# Patient Record
Sex: FEMALE | Race: WHITE
[De-identification: names, ages, dates, MRNs, and addresses within clinical notes are randomized per-mention and may not be internally consistent; named-entity substitution may affect disease eponyms.]

---

## 2017-01-20 ENCOUNTER — HOSPITAL ENCOUNTER (INPATIENT)
Dept: HOSPITAL 10 - REC | Age: 67
LOS: 3 days | Discharge: HOME HEALTH | DRG: 470 | End: 2017-01-23
Attending: ORTHOPAEDIC SURGERY | Admitting: ORTHOPAEDIC SURGERY
Payer: COMMERCIAL

## 2017-01-20 VITALS — SYSTOLIC BLOOD PRESSURE: 161 MMHG | DIASTOLIC BLOOD PRESSURE: 72 MMHG | RESPIRATION RATE: 18 BRPM | HEART RATE: 84 BPM

## 2017-01-20 VITALS — DIASTOLIC BLOOD PRESSURE: 65 MMHG | RESPIRATION RATE: 18 BRPM | HEART RATE: 85 BPM | SYSTOLIC BLOOD PRESSURE: 146 MMHG

## 2017-01-20 VITALS — DIASTOLIC BLOOD PRESSURE: 60 MMHG | RESPIRATION RATE: 16 BRPM | HEART RATE: 56 BPM | SYSTOLIC BLOOD PRESSURE: 166 MMHG

## 2017-01-20 VITALS — DIASTOLIC BLOOD PRESSURE: 85 MMHG | SYSTOLIC BLOOD PRESSURE: 163 MMHG | RESPIRATION RATE: 18 BRPM | HEART RATE: 62 BPM

## 2017-01-20 VITALS — HEART RATE: 90 BPM | DIASTOLIC BLOOD PRESSURE: 67 MMHG | RESPIRATION RATE: 18 BRPM | SYSTOLIC BLOOD PRESSURE: 154 MMHG

## 2017-01-20 VITALS — RESPIRATION RATE: 16 BRPM | HEART RATE: 68 BPM | DIASTOLIC BLOOD PRESSURE: 81 MMHG | SYSTOLIC BLOOD PRESSURE: 179 MMHG

## 2017-01-20 VITALS — RESPIRATION RATE: 16 BRPM | DIASTOLIC BLOOD PRESSURE: 95 MMHG | SYSTOLIC BLOOD PRESSURE: 175 MMHG | HEART RATE: 56 BPM

## 2017-01-20 VITALS — RESPIRATION RATE: 16 BRPM | HEART RATE: 60 BPM | SYSTOLIC BLOOD PRESSURE: 175 MMHG | DIASTOLIC BLOOD PRESSURE: 68 MMHG

## 2017-01-20 VITALS — HEART RATE: 87 BPM | SYSTOLIC BLOOD PRESSURE: 156 MMHG | DIASTOLIC BLOOD PRESSURE: 72 MMHG | RESPIRATION RATE: 18 BRPM

## 2017-01-20 VITALS — DIASTOLIC BLOOD PRESSURE: 89 MMHG | HEART RATE: 68 BPM | SYSTOLIC BLOOD PRESSURE: 175 MMHG | RESPIRATION RATE: 16 BRPM

## 2017-01-20 VITALS — RESPIRATION RATE: 20 BRPM | SYSTOLIC BLOOD PRESSURE: 106 MMHG | DIASTOLIC BLOOD PRESSURE: 70 MMHG

## 2017-01-20 VITALS — RESPIRATION RATE: 18 BRPM | HEART RATE: 84 BPM | SYSTOLIC BLOOD PRESSURE: 159 MMHG | DIASTOLIC BLOOD PRESSURE: 68 MMHG

## 2017-01-20 VITALS — RESPIRATION RATE: 16 BRPM | HEART RATE: 56 BPM | DIASTOLIC BLOOD PRESSURE: 60 MMHG | SYSTOLIC BLOOD PRESSURE: 166 MMHG

## 2017-01-20 VITALS — SYSTOLIC BLOOD PRESSURE: 139 MMHG | DIASTOLIC BLOOD PRESSURE: 75 MMHG | HEART RATE: 83 BPM | RESPIRATION RATE: 18 BRPM

## 2017-01-20 VITALS — DIASTOLIC BLOOD PRESSURE: 92 MMHG | HEART RATE: 60 BPM | RESPIRATION RATE: 16 BRPM | SYSTOLIC BLOOD PRESSURE: 175 MMHG

## 2017-01-20 VITALS — RESPIRATION RATE: 16 BRPM | HEART RATE: 58 BPM | DIASTOLIC BLOOD PRESSURE: 68 MMHG | SYSTOLIC BLOOD PRESSURE: 167 MMHG

## 2017-01-20 VITALS — DIASTOLIC BLOOD PRESSURE: 85 MMHG | SYSTOLIC BLOOD PRESSURE: 185 MMHG | RESPIRATION RATE: 10 BRPM | HEART RATE: 63 BPM

## 2017-01-20 VITALS — DIASTOLIC BLOOD PRESSURE: 68 MMHG | RESPIRATION RATE: 16 BRPM | SYSTOLIC BLOOD PRESSURE: 180 MMHG | HEART RATE: 59 BPM

## 2017-01-20 VITALS — HEART RATE: 60 BPM | RESPIRATION RATE: 16 BRPM | DIASTOLIC BLOOD PRESSURE: 103 MMHG | SYSTOLIC BLOOD PRESSURE: 188 MMHG

## 2017-01-20 VITALS — HEART RATE: 63 BPM | DIASTOLIC BLOOD PRESSURE: 80 MMHG | RESPIRATION RATE: 16 BRPM | SYSTOLIC BLOOD PRESSURE: 172 MMHG

## 2017-01-20 VITALS
WEIGHT: 225.09 LBS | BODY MASS INDEX: 39.88 KG/M2 | BODY MASS INDEX: 39.88 KG/M2 | BODY MASS INDEX: 39.88 KG/M2 | WEIGHT: 225.09 LBS | HEIGHT: 63 IN | HEIGHT: 63 IN

## 2017-01-20 VITALS — HEART RATE: 74 BPM | RESPIRATION RATE: 16 BRPM | DIASTOLIC BLOOD PRESSURE: 83 MMHG | SYSTOLIC BLOOD PRESSURE: 172 MMHG

## 2017-01-20 VITALS — HEART RATE: 58 BPM | DIASTOLIC BLOOD PRESSURE: 88 MMHG | SYSTOLIC BLOOD PRESSURE: 184 MMHG | RESPIRATION RATE: 16 BRPM

## 2017-01-20 VITALS — DIASTOLIC BLOOD PRESSURE: 60 MMHG | SYSTOLIC BLOOD PRESSURE: 140 MMHG | HEART RATE: 83 BPM | RESPIRATION RATE: 18 BRPM

## 2017-01-20 VITALS — HEART RATE: 58 BPM | DIASTOLIC BLOOD PRESSURE: 68 MMHG | SYSTOLIC BLOOD PRESSURE: 175 MMHG | RESPIRATION RATE: 16 BRPM

## 2017-01-20 VITALS — HEART RATE: 59 BPM | SYSTOLIC BLOOD PRESSURE: 175 MMHG | RESPIRATION RATE: 16 BRPM | DIASTOLIC BLOOD PRESSURE: 68 MMHG

## 2017-01-20 VITALS — SYSTOLIC BLOOD PRESSURE: 151 MMHG | RESPIRATION RATE: 18 BRPM | DIASTOLIC BLOOD PRESSURE: 69 MMHG | HEART RATE: 87 BPM

## 2017-01-20 VITALS — SYSTOLIC BLOOD PRESSURE: 173 MMHG | DIASTOLIC BLOOD PRESSURE: 74 MMHG | HEART RATE: 59 BPM | RESPIRATION RATE: 16 BRPM

## 2017-01-20 DIAGNOSIS — M17.11: Primary | ICD-10-CM

## 2017-01-20 DIAGNOSIS — L29.9: ICD-10-CM

## 2017-01-20 DIAGNOSIS — E66.9: ICD-10-CM

## 2017-01-20 DIAGNOSIS — I10: ICD-10-CM

## 2017-01-20 PROCEDURE — 87086 URINE CULTURE/COLONY COUNT: CPT

## 2017-01-20 PROCEDURE — 97116 GAIT TRAINING THERAPY: CPT

## 2017-01-20 PROCEDURE — 73560 X-RAY EXAM OF KNEE 1 OR 2: CPT

## 2017-01-20 PROCEDURE — 88304 TISSUE EXAM BY PATHOLOGIST: CPT

## 2017-01-20 PROCEDURE — 80048 BASIC METABOLIC PNL TOTAL CA: CPT

## 2017-01-20 PROCEDURE — 0SRC0J9 REPLACEMENT OF RIGHT KNEE JOINT WITH SYNTHETIC SUBSTITUTE, CEMENTED, OPEN APPROACH: ICD-10-PCS | Performed by: ORTHOPAEDIC SURGERY

## 2017-01-20 PROCEDURE — 88311 DECALCIFY TISSUE: CPT

## 2017-01-20 PROCEDURE — 85025 COMPLETE CBC W/AUTO DIFF WBC: CPT

## 2017-01-20 PROCEDURE — 85610 PROTHROMBIN TIME: CPT

## 2017-01-20 PROCEDURE — 97530 THERAPEUTIC ACTIVITIES: CPT

## 2017-01-20 PROCEDURE — 97163 PT EVAL HIGH COMPLEX 45 MIN: CPT

## 2017-01-20 PROCEDURE — C1713 ANCHOR/SCREW BN/BN,TIS/BN: HCPCS

## 2017-01-20 RX ADMIN — DEXAMETHASONE SODIUM PHOSPHATE SCH MG: 10 INJECTION, SOLUTION INTRAMUSCULAR; INTRAVENOUS at 23:32

## 2017-01-20 RX ADMIN — HYDRALAZINE HYDROCHLORIDE PRN MG: 20 INJECTION INTRAMUSCULAR; INTRAVENOUS at 13:13

## 2017-01-20 RX ADMIN — DEXAMETHASONE SODIUM PHOSPHATE SCH MG: 10 INJECTION, SOLUTION INTRAMUSCULAR; INTRAVENOUS at 17:00

## 2017-01-20 RX ADMIN — CEFAZOLIN SCH MLS/HR: 1 INJECTION, POWDER, FOR SOLUTION INTRAMUSCULAR; INTRAVENOUS at 18:48

## 2017-01-20 RX ADMIN — DEXAMETHASONE SODIUM PHOSPHATE SCH MG: 10 INJECTION, SOLUTION INTRAMUSCULAR; INTRAVENOUS at 12:07

## 2017-01-20 RX ADMIN — DIPHENHYDRAMINE HYDROCHLORIDE PRN MG: 50 INJECTION, SOLUTION INTRAMUSCULAR; INTRAVENOUS at 11:57

## 2017-01-20 RX ADMIN — HYDRALAZINE HYDROCHLORIDE PRN MG: 20 INJECTION INTRAMUSCULAR; INTRAVENOUS at 13:09

## 2017-01-20 RX ADMIN — CEFAZOLIN SCH MLS/HR: 1 INJECTION, POWDER, FOR SOLUTION INTRAMUSCULAR; INTRAVENOUS at 11:55

## 2017-01-20 RX ADMIN — DIPHENHYDRAMINE HYDROCHLORIDE PRN MG: 50 INJECTION, SOLUTION INTRAMUSCULAR; INTRAVENOUS at 17:25

## 2017-01-20 NOTE — OPR
DATE OF OPERATION:  01/20/2017

 

 

PREOPERATIVE DIAGNOSIS:  Right knee osteoarthritis, severe.

 

POSTOPERATIVE DIAGNOSIS:  Right knee osteoarthritis, severe.

 

OPERATION PERFORMED:  Right total knee replacement.

 

SURGEON:  Chris Streeter MD

 

ASSISTANT:  KARYN Solis

 

ANESTHESIA:  Spinal anesthetic _____ adductor canal block with general endotracheal anesthesia.

 

ANESTHESIOLOGIST:  Dr. Jeffery Hargrove 

 

INDICATIONS FOR PROCEDURE:  Ms. Feldman is a 66-year-old female who has had progressive pain in the rig
ht knee related to severe osteoarthritis.  She has failed nonoperative treatment and now presents fo
r elective right total knee replacement.

 

Risks and benefits were discussed, risks including but not limited to infection, bleeding, blood louise
ts, hardware failure, hardware prominence, dislocation, fracture, nerve damage, blood vessel damage,
 along with anesthetic complications, and informed consent was obtained.

 

DESCRIPTION OF PROCEDURE:  The patient's correct extremity was identified in the preoperative area. 
 She was brought back to the operating room where she had a spinal anesthetic.  She then had general
 endotracheal anesthesia.  Right lower extremity was prepped and draped in standard sterile manner. 
 Preoperative antibiotics were administered.  A time-out was performed.  I then used an Esmarch to e
xsanguinate the extremity, a thigh tourniquet was inflated to 250 mmHg. I then made a standard midli
ne incision for approaching the knee.  I went through skin and subcutaneous tissue, incised the fasc
ia, made a medial parapatellar arthrotomy, flexed the knee, made an entry hole in the distal femur, 
made my distal femoral cut.  I then marked the rotation 3 degrees of external rotation.  I then made
 my anterior, posterior chamfer cuts.  I then turned my attention to the tibia.  I put my retractors
, made my tibial cut until flexion and extension gaps were symmetric with a 13 block.  I then triale
d using a size 4 femur, size 4 tibia, and a 13 insert.  The knee went from full extension to full fl
exion with just a jog opening varus valgus stress.  I then turned my attention to patella and over t
he top a _____ cutting guide was used to cut the patellar to the appropriate thickness.  An asymmetr
ic 38 patella had a good fit and did track well without any external pressure.  I then made my peg h
oles through the femoral trial.  I punched the tibia.  I then took out all trial instruments and tho
roughly irrigated the bony surfaces, dried them with a lap sponge, and then cemented a size 4 tibia,
 size 4 femur, asymmetric 38 patella, a trialed 13 insert was placed until cement hardened, I then t
ook out the trial insert and let down the tourniquet, obtained adequate hemostasis.  I then impacted
 a 13 mm all poly insert until it locked into place.  I did not need to use a drain.  I thoroughly i
rrigated the knee joint and closed the medial parapatellar arthrotomy with #1 Vicryl, subcutaneous t
issue was closed with 2-0 Vicryl, skin with staples.  Dry sterile dressing was applied.  The patient
 had 2+ dorsalis pedis pulse with a warm foot at the end of the case.  She was then extubated and tr
ansported to recovery in stable condition.

 

 

Dictated By: CHRIS STREETER MD, RA/ALEXIA

DD:    01/20/2017 11:10:08

DT:    01/20/2017 11:59:41

Conf#: [id number]

DID#:  [id number]

## 2017-01-20 NOTE — OPPN
Date/Time of Note


Date/Time of Note


DATE: 1/20/17 


TIME: 10:56





Operative/Procedure Note


Right knee OA


Pre-Operative Diagnosis


Right knee OA


Post-Operative Diagnosis


Same


Procedure


Right Total knee replacement


Surgeon:  KASIE GERONIMO MD


Assistant:  LENI DE LOS SANTOS


Anesthesiologist:  NATALIA SAN DO


Implants/Grafts


[employer]plants


Femur size 4


Tibia size 4


Insert 13 mm


Patella A38


Drains


None


Complications:  None


Anesthesia type:  regional











KASIE GERONIMO MD Jan 20, 2017 10:58

## 2017-01-20 NOTE — HPN
Date/Time of Note


Date/Time of Note


DATE: 1/20/17 


TIME: 08:23





Interval H&P Admission Note


Pt. seen H&P reviewed:  No system changes











KASIE GERONIMO MD Jan 20, 2017 08:23

## 2017-01-20 NOTE — RADRPT
PROCEDURE:   XR right knee. 

 

CLINICAL INDICATION:   Knee pain. 

 

TECHNIQUE:   AP and lateral views are available for review. 

 

COMPARISON:   No comparison available 

 

FINDINGS:

 

There is a postoperative total knee replacement. There is no evidence of loosening of the prosthesis
. The osseous structures are normal in mineralization, architecture and alignment No acute fracture 
or dislocation is seen.No osseous lesions are identified.  There are postoperative soft tissue sheppard
es . 

 

IMPRESSION:

 

Unremarkable postoperative total knee replacement.

Postoperative soft tissue changes

 

 

RPTAT: HGDB

_____________________________________________ 

.Wilton Malcolm MD, MD           Date    Time 

Electronically viewed and signed by .Wilton Malcolm MD, MD on 01/20/2017 12:00 

 

D:  01/20/2017 12:00  T:  01/20/2017 12:00

.B/

## 2017-01-21 VITALS — DIASTOLIC BLOOD PRESSURE: 73 MMHG | SYSTOLIC BLOOD PRESSURE: 163 MMHG | HEART RATE: 72 BPM | RESPIRATION RATE: 18 BRPM

## 2017-01-21 VITALS — SYSTOLIC BLOOD PRESSURE: 127 MMHG | DIASTOLIC BLOOD PRESSURE: 60 MMHG | RESPIRATION RATE: 20 BRPM

## 2017-01-21 VITALS — RESPIRATION RATE: 20 BRPM | SYSTOLIC BLOOD PRESSURE: 113 MMHG | DIASTOLIC BLOOD PRESSURE: 52 MMHG

## 2017-01-21 VITALS — SYSTOLIC BLOOD PRESSURE: 176 MMHG | DIASTOLIC BLOOD PRESSURE: 77 MMHG | RESPIRATION RATE: 20 BRPM

## 2017-01-21 VITALS — SYSTOLIC BLOOD PRESSURE: 121 MMHG | DIASTOLIC BLOOD PRESSURE: 58 MMHG | HEART RATE: 86 BPM | RESPIRATION RATE: 18 BRPM

## 2017-01-21 LAB
ANION GAP SERPL CALC-SCNC: 13 MMOL/L (ref 8–16)
BASOPHILS # BLD AUTO: 0 10^3/UL (ref 0–0.1)
BASOPHILS NFR BLD: 0.3 % (ref 0–2)
BUN SERPL-MCNC: 16 MG/DL (ref 7–20)
CALCIUM SERPL-MCNC: 8 MG/DL (ref 8.4–10.2)
CHLORIDE SERPL-SCNC: 101 MMOL/L (ref 97–110)
CO2 SERPL-SCNC: 26 MMOL/L (ref 21–31)
CONDITION: 1
CREAT SERPL-MCNC: 0.84 MG/DL (ref 0.44–1)
EOSINOPHIL # BLD: 0.2 10^3/UL (ref 0–0.5)
EOSINOPHIL NFR BLD: 1.5 % (ref 0–7)
ERYTHROCYTE [DISTWIDTH] IN BLOOD BY AUTOMATED COUNT: 13.3 % (ref 11.5–14.5)
GLUCOSE SERPL-MCNC: 130 MG/DL (ref 70–220)
HCT VFR BLD CALC: 33.3 % (ref 37–47)
HGB BLD-MCNC: 11.4 G/DL (ref 12–16)
INR PPP: 1.15
LYMPHOCYTES # BLD AUTO: 1.6 10^3/UL (ref 0.8–2.9)
LYMPHOCYTES NFR BLD AUTO: 14.5 % (ref 15–51)
MCH RBC QN AUTO: 30.1 PG (ref 29–33)
MCHC RBC AUTO-ENTMCNC: 34.4 G/DL (ref 32–37)
MCV RBC AUTO: 87.3 FL (ref 82–101)
MONOCYTES # BLD: 0.9 10^3/UL (ref 0.3–0.9)
MONOCYTES NFR BLD: 8 % (ref 0–11)
NEUTROPHILS # BLD: 8.5 10^3/UL (ref 1.6–7.5)
NEUTROPHILS NFR BLD AUTO: 75.7 % (ref 39–77)
NRBC # BLD MANUAL: 0 10^3/UL (ref 0–0)
NRBC BLD QL: 0 /100WBC (ref 0–0)
PLATELET # BLD: 185 10^3/UL (ref 140–440)
PMV BLD AUTO: 9.2 FL (ref 7.4–10.4)
POTASSIUM SERPL-SCNC: 4.2 MMOL/L (ref 3.5–5.1)
PROTHROMBIN TIME: 14.7 SEC (ref 12.2–14.2)
PT RATIO: 1.1
RBC # BLD AUTO: 3.81 10^6/UL (ref 4.2–5.4)
SODIUM SERPL-SCNC: 136 MMOL/L (ref 135–144)
WBC # BLD AUTO: 11.3 10^3/UL (ref 4.8–10.8)
WBC # BLD: 11.3 10^3/UL (ref 4.8–10.8)

## 2017-01-21 RX ADMIN — ASPIRIN SCH MG: 325 TABLET, DELAYED RELEASE ORAL at 20:01

## 2017-01-21 RX ADMIN — ASPIRIN SCH MG: 325 TABLET, DELAYED RELEASE ORAL at 10:09

## 2017-01-21 RX ADMIN — DOCUSATE SODIUM SCH MG: 100 CAPSULE, LIQUID FILLED ORAL at 20:01

## 2017-01-21 RX ADMIN — HYDROCODONE BITARTRATE AND ACETAMINOPHEN PRN TAB: 5; 325 TABLET ORAL at 16:55

## 2017-01-21 RX ADMIN — DOCUSATE SODIUM SCH MG: 100 CAPSULE, LIQUID FILLED ORAL at 10:09

## 2017-01-21 RX ADMIN — HYDROCODONE BITARTRATE AND ACETAMINOPHEN PRN TAB: 5; 325 TABLET ORAL at 21:23

## 2017-01-21 RX ADMIN — DEXAMETHASONE SODIUM PHOSPHATE SCH MG: 10 INJECTION, SOLUTION INTRAMUSCULAR; INTRAVENOUS at 05:54

## 2017-01-21 RX ADMIN — CEFAZOLIN SCH MLS/HR: 1 INJECTION, POWDER, FOR SOLUTION INTRAMUSCULAR; INTRAVENOUS at 03:16

## 2017-01-21 NOTE — PN
Date/Time of Note


Date/Time of Note


DATE: 1/21/17 


TIME: 09:02





Assessment/Plan


Lines/Catheters


IV Catheter Type (from Nrs):  Peripheral IV


[place] in Place (from Nrs):  Yes





Assessment/Plan


Chief Complaint/Hosp Course


s/p R Total Knee replacement


Problems:  


Assessment/Plan


OOB with PT, CPM


Aspirin for DVT prophylaxis


SCD's B LE


D/c [place]


Discharge planning, home on Monday





Subjective


24 Hr Interval Summary


Complaining of some pain





Exam/Review of Systems


Vital Signs


Vitals





 Vital Signs








  Date Time  Temp Pulse Resp B/P Pulse Ox O2 Delivery O2 Flow Rate FiO2


 


1/21/17 07:00 98.1 77 20 127/60 97   


 


1/21/17 04:00      Room Air  


 


1/20/17 11:30       6.0 














 Intake and Output   


 


 1/20/17 1/20/17 1/21/17





 15:00 23:00 07:00


 


Intake Total 1300 ml 750 ml 550 ml


 


Output Total 350 ml 1100 ml 1100 ml


 


Balance 950 ml -350 ml -550 ml











Exam


Free Text/Dictation


R LE- Dressing c/d/i.


Calf soft and non tender


Foot warm 2+ DP


Intact motor and sensory function in toes


Constitutional:  alert





Results


Result Diagram:  


1/21/17 0415                                                                   

             1/21/17 0415














KASIE GERONIMO MD Jan 21, 2017 09:04

## 2017-01-21 NOTE — PN
Date/Time of Note


Date/Time of Note


DATE: 1/21/17 


TIME: 11:31





Assessment/Plan


VTE Prophylaxis


VTE Prophylaxis Intervention:  other





Lines/Catheters


IV Catheter Type (from Nrsg):  Saline Lock


Urinary Cath still in place:  No (DC'D)





Assessment/Plan


Chief Complaint/Hosp Course


1) knee pain


- s/p total knee replacement


- PT


Problems:  





Subjective


24 Hr Interval Summary


Free Text/Dictation


Pruritis is better but now patient is concerned about being too drowsy with 

narcotic pain medication





Exam/Review of Systems


Vital Signs


Vitals





 Vital Signs








  Date Time  Temp Pulse Resp B/P Pulse Ox O2 Delivery O2 Flow Rate FiO2


 


1/21/17 08:00       2.0 


 


1/21/17 07:00 98.1 77 20 127/60 97   


 


1/21/17 04:00      Room Air  














 Intake and Output   


 


 1/20/17 1/20/17 1/21/17





 15:00 23:00 07:00


 


Intake Total 1300 ml 750 ml 550 ml


 


Output Total 350 ml 1100 ml 1100 ml


 


Balance 950 ml -350 ml -550 ml











Exam


Constitutional:  well developed


Head:  atraumatic, normocephalic


Neck:  supple


Respiratory:  clear to auscultation


Cardiovascular:  regular rate and rhythm


Gastrointestinal:  non-tender, soft





Results


Result Diagram:  


1/21/17 0415                                                                   

             1/21/17 0415





Results 24 hrs





Laboratory Tests








Test


  1/21/17


04:15


 


Anion Gap 13  


 


Basophils # 0.0  


 


Basophils % 0.3  


 


Blood Urea Nitrogen 16  


 


Calcium Level 8.0  L


 


Carbon Dioxide Level 26  


 


Chloride Level 101  


 


Creatinine 0.84  


 


Eosinophils # 0.2  


 


Eosinophils % 1.5  


 


Glucose Level 130  


 


Hematocrit 33.3  L


 


Hemoglobin 11.4  L


 


INR International Normalized


Ratio 1.15  


 


 


Lymphocytes # 1.6  


 


Lymphocytes % 14.5  L


 


Mean Corpuscular Hemoglobin 30.1  


 


Mean Corpuscular Hemoglobin


Concent 34.4  


 


 


Mean Corpuscular Volume 87.3  


 


Mean Platelet Volume 9.2  


 


Monocytes # 0.9  


 


Monocytes % 8.0  


 


Neutrophils # 8.5  H


 


Neutrophils % 75.7  


 


Nucleated Red Blood Cells # 0.0  


 


Nucleated Red Blood Cells % 0.0  


 


Platelet Count 185  


 


Potassium Level 4.2  


 


Prothrombin Time 14.7  H


 


Prothrombin Time Ratio 1.1  


 


Red Blood Count 3.81  L


 


Red Cell Distribution Width 13.3  


 


Sodium Level 136  


 


White Blood Count 11.3  H











Medications


Medications





 Current Medications


Oxycodone HCl (Roxicodone) 20 mg Q3H  PRN PO PAIN LEVEL 8-10;  Start 1/20/17 at 

11:00


Oxycodone HCl (Roxicodone) 10 mg Q3H  PRN PO PAIN LEVEL 4-7 Last administered 

on 1/21/17at 07:26; Admin Dose 10 MG;  Start 1/20/17 at 11:00


Oxycodone HCl (Roxicodone) 5 mg Q3H  PRN PO PAIN LEVEL 1-3;  Start 1/20/17 at 11

:00


Acetaminophen (Tylenol Tab) 500 mg Q6 PO  Last administered on 1/21/17at 05:54; 

Admin Dose 500 MG;  Start 1/20/17 at 12:00


Zolpidem Tartrate (Ambien) 5 mg HS  PRN PO INSOMNIA;  Start 1/20/17 at 11:00


Aspirin (Ecotrin) 325 mg BID PO  Last administered on 1/21/17at 10:09; Admin 

Dose 325 MG;  Start 1/21/17 at 09:00


Pantoprazole (Protonix Tab) 40 mg DAILY@06 PO ;  Start 1/22/17 at 06:00


Docusate Sodium (Colace) 200 mg BID PO  Last administered on 1/21/17at 10:09; 

Admin Dose 200 MG;  Start 1/21/17 at 09:00;  Stop 1/24/17 at 08:59


Senna/Docusate Sodium (Senokot-S) 2 tab BID  PRN PO CONSTIPATION;  Start 1/20/ 17 at 11:00


Magnesium Hydroxide (Milk Of Mag) 30 ml HS  PRN PO CONSTIPATION;  Start 1/20/17 

at 11:00


Naloxone HCl (Narcan) 0.2 mg Q2M PRN IV DECREASED REPIRATORY RATE;  Start 1/20/ 17 at 11:00


Hydroxyzine HCl (Atarax) 10 mg Q6H  PRN GTB ITCHING Last administered on 1/21/ 17at 05:54; Admin Dose 10 MG;  Start 1/20/17 at 15:00


Clonidine (Catapres) 0.1 mg Q6H  PRN PO SBP GREATER THAN 160 Last administered 

on 1/20/17at 17:25; Admin Dose 0.1 MG;  Start 1/20/17 at 17:00











LESA CORTES Jan 21, 2017 11:32

## 2017-01-22 VITALS — DIASTOLIC BLOOD PRESSURE: 79 MMHG | SYSTOLIC BLOOD PRESSURE: 193 MMHG | HEART RATE: 97 BPM | RESPIRATION RATE: 17 BRPM

## 2017-01-22 VITALS — SYSTOLIC BLOOD PRESSURE: 155 MMHG | DIASTOLIC BLOOD PRESSURE: 69 MMHG | RESPIRATION RATE: 20 BRPM

## 2017-01-22 VITALS — DIASTOLIC BLOOD PRESSURE: 66 MMHG | SYSTOLIC BLOOD PRESSURE: 144 MMHG | HEART RATE: 94 BPM

## 2017-01-22 VITALS — HEART RATE: 68 BPM | DIASTOLIC BLOOD PRESSURE: 78 MMHG | SYSTOLIC BLOOD PRESSURE: 144 MMHG | RESPIRATION RATE: 18 BRPM

## 2017-01-22 VITALS — SYSTOLIC BLOOD PRESSURE: 140 MMHG | RESPIRATION RATE: 18 BRPM | DIASTOLIC BLOOD PRESSURE: 62 MMHG | HEART RATE: 72 BPM

## 2017-01-22 VITALS — DIASTOLIC BLOOD PRESSURE: 67 MMHG | SYSTOLIC BLOOD PRESSURE: 123 MMHG

## 2017-01-22 VITALS — SYSTOLIC BLOOD PRESSURE: 151 MMHG | RESPIRATION RATE: 20 BRPM | DIASTOLIC BLOOD PRESSURE: 70 MMHG

## 2017-01-22 VITALS — SYSTOLIC BLOOD PRESSURE: 138 MMHG | RESPIRATION RATE: 16 BRPM | DIASTOLIC BLOOD PRESSURE: 63 MMHG

## 2017-01-22 LAB
ANION GAP SERPL CALC-SCNC: 14 MMOL/L (ref 8–16)
BASOPHILS # BLD AUTO: 0 10^3/UL (ref 0–0.1)
BASOPHILS NFR BLD: 0.3 % (ref 0–2)
BUN SERPL-MCNC: 14 MG/DL (ref 7–20)
CALCIUM SERPL-MCNC: 8.5 MG/DL (ref 8.4–10.2)
CHLORIDE SERPL-SCNC: 97 MMOL/L (ref 97–110)
CO2 SERPL-SCNC: 29 MMOL/L (ref 21–31)
CONDITION: 1
CREAT SERPL-MCNC: 0.82 MG/DL (ref 0.44–1)
EOSINOPHIL # BLD: 0.1 10^3/UL (ref 0–0.5)
EOSINOPHIL NFR BLD: 1 % (ref 0–7)
ERYTHROCYTE [DISTWIDTH] IN BLOOD BY AUTOMATED COUNT: 13.4 % (ref 11.5–14.5)
GLUCOSE SERPL-MCNC: 117 MG/DL (ref 70–220)
HCT VFR BLD CALC: 32.9 % (ref 37–47)
HGB BLD-MCNC: 11.5 G/DL (ref 12–16)
INR PPP: 1.22
LYMPHOCYTES # BLD AUTO: 2.6 10^3/UL (ref 0.8–2.9)
LYMPHOCYTES NFR BLD AUTO: 23.4 % (ref 15–51)
MCH RBC QN AUTO: 30.5 PG (ref 29–33)
MCHC RBC AUTO-ENTMCNC: 34.9 G/DL (ref 32–37)
MCV RBC AUTO: 87.6 FL (ref 82–101)
MONOCYTES # BLD: 1.1 10^3/UL (ref 0.3–0.9)
MONOCYTES NFR BLD: 9.7 % (ref 0–11)
NEUTROPHILS # BLD: 7.4 10^3/UL (ref 1.6–7.5)
NEUTROPHILS NFR BLD AUTO: 65.6 % (ref 39–77)
NRBC # BLD MANUAL: 0 10^3/UL (ref 0–0)
NRBC BLD QL: 0 /100WBC (ref 0–0)
PLATELET # BLD: 183 10^3/UL (ref 140–440)
PMV BLD AUTO: 9.1 FL (ref 7.4–10.4)
POTASSIUM SERPL-SCNC: 3.9 MMOL/L (ref 3.5–5.1)
PROTHROMBIN TIME: 15.5 SEC (ref 12.2–14.2)
PT RATIO: 1.2
RBC # BLD AUTO: 3.76 10^6/UL (ref 4.2–5.4)
SODIUM SERPL-SCNC: 136 MMOL/L (ref 135–144)
WBC # BLD AUTO: 11.3 10^3/UL (ref 4.8–10.8)
WBC # BLD: 11.3 10^3/UL (ref 4.8–10.8)

## 2017-01-22 RX ADMIN — ASPIRIN SCH MG: 325 TABLET, DELAYED RELEASE ORAL at 20:05

## 2017-01-22 RX ADMIN — ASPIRIN SCH MG: 325 TABLET, DELAYED RELEASE ORAL at 08:35

## 2017-01-22 RX ADMIN — DOCUSATE SODIUM SCH MG: 100 CAPSULE, LIQUID FILLED ORAL at 20:05

## 2017-01-22 RX ADMIN — PANTOPRAZOLE SODIUM SCH MG: 40 TABLET, DELAYED RELEASE ORAL at 06:02

## 2017-01-22 RX ADMIN — DOCUSATE SODIUM SCH MG: 100 CAPSULE, LIQUID FILLED ORAL at 08:35

## 2017-01-22 NOTE — PN
Date/Time of Note


Date/Time of Note


DATE: 1/22/17 


TIME: 12:15





Assessment/Plan


VTE Prophylaxis


VTE Prophylaxis Intervention:  other





Lines/Catheters


IV Catheter Type (from Nrsg):  Saline Lock


Urinary Cath still in place:  No





Assessment/Plan


Chief Complaint/Hosp Course


1) knee pain


- s/p total knee replacement


- PT


Problems:  





Subjective


24 Hr Interval Summary


Free Text/Dictation


Patient feels better, able to ambulate now





Exam/Review of Systems


Vital Signs


Vitals





 Vital Signs








  Date Time  Temp Pulse Resp B/P Pulse Ox O2 Delivery O2 Flow Rate FiO2


 


1/22/17 12:11  94  144/66    


 


1/22/17 07:26 98.6  16  96   


 


1/22/17 06:08      Room Air  


 


1/21/17 08:00       2.0 














 Intake and Output   


 


 1/21/17 1/21/17 1/22/17





 15:00 23:00 07:00


 


Intake Total  940 ml 900 ml


 


Output Total   1100 ml


 


Balance  940 ml -200 ml











Exam


Constitutional:  well developed


Head:  atraumatic, normocephalic


Neck:  supple


Respiratory:  clear to auscultation


Cardiovascular:  regular rate and rhythm


Gastrointestinal:  non-tender, soft





Results


Result Diagram:  


1/22/17 0425                                                                   

             1/22/17 0425





Results 24 hrs





Laboratory Tests








Test


  1/22/17


04:25


 


Anion Gap 14  


 


Basophils # 0.0  


 


Basophils % 0.3  


 


Blood Urea Nitrogen 14  


 


Calcium Level 8.5  


 


Carbon Dioxide Level 29  


 


Chloride Level 97  


 


Creatinine 0.82  


 


Eosinophils # 0.1  


 


Eosinophils % 1.0  


 


Glucose Level 117  


 


Hematocrit 32.9  L


 


Hemoglobin 11.5  L


 


INR International Normalized


Ratio 1.22  


 


 


Lymphocytes # 2.6  


 


Lymphocytes % 23.4  


 


Mean Corpuscular Hemoglobin 30.5  


 


Mean Corpuscular Hemoglobin


Concent 34.9  


 


 


Mean Corpuscular Volume 87.6  


 


Mean Platelet Volume 9.1  


 


Monocytes # 1.1  H


 


Monocytes % 9.7  


 


Neutrophils # 7.4  


 


Neutrophils % 65.6  


 


Nucleated Red Blood Cells # 0.0  


 


Nucleated Red Blood Cells % 0.0  


 


Platelet Count 183  


 


Potassium Level 3.9  


 


Prothrombin Time 15.5  H


 


Prothrombin Time Ratio 1.2  


 


Red Blood Count 3.76  L


 


Red Cell Distribution Width 13.4  


 


Sodium Level 136  


 


White Blood Count 11.3  H











Medications


Medications





 Current Medications


Oxycodone HCl (Roxicodone) 20 mg Q3H  PRN PO PAIN LEVEL 8-10;  Start 1/20/17 at 

11:00


Oxycodone HCl (Roxicodone) 10 mg Q3H  PRN PO PAIN LEVEL 4-7 Last administered 

on 1/21/17at 07:26; Admin Dose 10 MG;  Start 1/20/17 at 11:00


Oxycodone HCl (Roxicodone) 5 mg Q3H  PRN PO PAIN LEVEL 1-3;  Start 1/20/17 at 11

:00


Acetaminophen (Tylenol Tab) 500 mg Q6 PO  Last administered on 1/22/17at 12:11; 

Admin Dose 500 MG;  Start 1/20/17 at 12:00


Zolpidem Tartrate (Ambien) 5 mg HS  PRN PO INSOMNIA;  Start 1/20/17 at 11:00


Aspirin (Ecotrin) 325 mg BID PO  Last administered on 1/22/17at 08:35; Admin 

Dose 325 MG;  Start 1/21/17 at 09:00


Pantoprazole (Protonix Tab) 40 mg DAILY@06 PO  Last administered on 1/22/17at 06

:02; Admin Dose 40 MG;  Start 1/22/17 at 06:00


Docusate Sodium (Colace) 200 mg BID PO  Last administered on 1/22/17at 08:35; 

Admin Dose 200 MG;  Start 1/21/17 at 09:00;  Stop 1/24/17 at 08:59


Senna/Docusate Sodium (Senokot-S) 2 tab BID  PRN PO CONSTIPATION;  Start 1/20/ 17 at 11:00


Magnesium Hydroxide (Milk Of Mag) 30 ml HS  PRN PO CONSTIPATION;  Start 1/20/17 

at 11:00


Naloxone HCl (Narcan) 0.2 mg Q2M PRN IV DECREASED REPIRATORY RATE;  Start 1/20/ 17 at 11:00


Hydroxyzine HCl (Atarax) 10 mg Q6H  PRN GTB ITCHING Last administered on 1/21/ 17at 05:54; Admin Dose 10 MG;  Start 1/20/17 at 15:00


Clonidine (Catapres) 0.1 mg Q6H  PRN PO SBP GREATER THAN 160 Last administered 

on 1/21/17at 20:00; Admin Dose 0.1 MG;  Start 1/20/17 at 17:00


Acetaminophen/ Hydrocodone Bitart (Norco (5/325)) 1 tab Q6H  PRN PO moderate 

pain Last administered on 1/21/17at 21:23; Admin Dose 1 TAB;  Start 1/21/17 at 

11:30


Clonidine (Catapres) 0.1 mg Q4H  PRN PO SBP>160;  Start 1/21/17 at 20:00











LESA CORTES Jan 22, 2017 12:15

## 2017-01-23 VITALS — RESPIRATION RATE: 18 BRPM | SYSTOLIC BLOOD PRESSURE: 139 MMHG | DIASTOLIC BLOOD PRESSURE: 65 MMHG | HEART RATE: 93 BPM

## 2017-01-23 VITALS — SYSTOLIC BLOOD PRESSURE: 135 MMHG | DIASTOLIC BLOOD PRESSURE: 63 MMHG | RESPIRATION RATE: 18 BRPM

## 2017-01-23 LAB
ANION GAP SERPL CALC-SCNC: 13 MMOL/L (ref 8–16)
BASOPHILS # BLD AUTO: 0 10^3/UL (ref 0–0.1)
BASOPHILS NFR BLD: 0.3 % (ref 0–2)
BUN SERPL-MCNC: 13 MG/DL (ref 7–20)
CALCIUM SERPL-MCNC: 8.5 MG/DL (ref 8.4–10.2)
CHLORIDE SERPL-SCNC: 102 MMOL/L (ref 97–110)
CO2 SERPL-SCNC: 29 MMOL/L (ref 21–31)
CONDITION: 1
CREAT SERPL-MCNC: 0.75 MG/DL (ref 0.44–1)
EOSINOPHIL # BLD: 0.2 10^3/UL (ref 0–0.5)
EOSINOPHIL NFR BLD: 2.1 % (ref 0–7)
ERYTHROCYTE [DISTWIDTH] IN BLOOD BY AUTOMATED COUNT: 13.8 % (ref 11.5–14.5)
GLUCOSE SERPL-MCNC: 124 MG/DL (ref 70–220)
HCT VFR BLD CALC: 31.8 % (ref 37–47)
HGB BLD-MCNC: 11.2 G/DL (ref 12–16)
INR PPP: 1.22
LYMPHOCYTES # BLD AUTO: 2.8 10^3/UL (ref 0.8–2.9)
LYMPHOCYTES NFR BLD AUTO: 25.3 % (ref 15–51)
MCH RBC QN AUTO: 30.8 PG (ref 29–33)
MCHC RBC AUTO-ENTMCNC: 35.1 G/DL (ref 32–37)
MCV RBC AUTO: 87.6 FL (ref 82–101)
MONOCYTES # BLD: 0.9 10^3/UL (ref 0.3–0.9)
MONOCYTES NFR BLD: 7.7 % (ref 0–11)
NEUTROPHILS # BLD: 7.2 10^3/UL (ref 1.6–7.5)
NEUTROPHILS NFR BLD AUTO: 64.6 % (ref 39–77)
NRBC # BLD MANUAL: 0 10^3/UL (ref 0–0)
NRBC BLD QL: 0 /100WBC (ref 0–0)
PLATELET # BLD: 185 10^3/UL (ref 140–440)
PMV BLD AUTO: 9.1 FL (ref 7.4–10.4)
POTASSIUM SERPL-SCNC: 3.9 MMOL/L (ref 3.5–5.1)
PROTHROMBIN TIME: 15.5 SEC (ref 12.2–14.2)
PT RATIO: 1.2
RBC # BLD AUTO: 3.63 10^6/UL (ref 4.2–5.4)
SODIUM SERPL-SCNC: 140 MMOL/L (ref 135–144)
WBC # BLD AUTO: 11.1 10^3/UL (ref 4.8–10.8)
WBC # BLD: 11.1 10^3/UL (ref 4.8–10.8)

## 2017-01-23 RX ADMIN — ASPIRIN SCH MG: 325 TABLET, DELAYED RELEASE ORAL at 09:06

## 2017-01-23 RX ADMIN — PANTOPRAZOLE SODIUM SCH MG: 40 TABLET, DELAYED RELEASE ORAL at 06:00

## 2017-01-23 RX ADMIN — DOCUSATE SODIUM SCH MG: 100 CAPSULE, LIQUID FILLED ORAL at 09:06

## 2017-01-23 NOTE — PN
DATE:  

 

 

SUBJECTIVE:  The patient has no pain today.  She has been working with physical therapy.  She is afe
brile.  Vital signs are stable.  

 

OBJECTIVE:  Evaluation of the right lower extremity reveals the incision is clean, dry and intact.  
There is no erythema.  Calf is soft.  She has intact motor and sensory function of the right foot, h
as 2+ dorsalis pedis pulse.  

 

Postop day #3. This patient is doing very well.  My recommendation is continued physical therapy.  C
ontinue the aspirin.  She will be discharged to home today.  She will need home physical therapy.  S
he will need home CPM, front-wheel walker.  She will continue to be on aspirin 325 mg p.o. b.i.d. fo
r DVT prophylaxis.

 

Follow up in my office in 10 days' time.

 

 

Dictated By: KASIE GERONIMO MD, RA/ALEXIA

DD:    01/23/2017 13:24:48

DT:    01/23/2017 15:45:21

Conf#: [id number]

DID#:  [id number]

## 2017-01-23 NOTE — PDOCDIS
Discharge Instructions


CONDITION


Patient Condition:  Good





HOME CARE INSTRUCTIONS:


Diet Instructions:  Reduced Sodium





ACTIVITY:








Activity Restrictions:   Slowly Increase Activity











FOLLOW UP/APPOINTMENTS


Appointments


PMD in 1-2 weeks


Dr. Streeter next week











ANGELINE FLYNN MD Jan 23, 2017 17:57

## 2017-01-24 NOTE — DS
DATE OF ADMISSION: 01/20/2017

DATE OF DISCHARGE: 01/23/2017

 

DISCHARGE DIAGNOSES:

1.  Right knee osteoarthritis status post right total knee replacement.

2.  Hypertension.

 

DISCHARGE MEDICATIONS:

1.  Norco 5/325 one to two q.4h. p.r.n.

2.  Senna 1 b.i.d.

3.  Norvasc 10 mg once a day.

 

FOLLOWUP:  The patient is to follow up with the PMD in 1 to 2 weeks.  Follow up with Dr. Streeter i
n 1 week.  Home health has been arranged.  

 

REASON FOR ADMISSION:  The patient is a 66-year-old female with a history of osteoarthritis of the r
ight knee, was seen by Dr. Streeter as an outpatient and was brought into the hospital for elective
 right total knee replacement.  The patient's postoperative course was complicated by multiple readi
ngs of elevated blood pressure, and for that the patient was put on clonidine on a p.r.n. basis.  Th
e patient progressed well with physical therapy and was seen by Dr. Streeter this morning and was c
leared for discharge.  The patient will also be given aspirin 325 b.i.d. for DVT prophylaxis for abo
ut 4 weeks.  Plan of care discussed with the patient's family.  On the day of discharge, the patient
 is breathing comfortably.  Denies any chest pain or shortness of breath.  Postoperative pain is wel
l controlled.

 

PHYSICAL EXAMINATION:

VITAL SIGNS:  Temperature 97.8, pulse 85, respirations 18, blood pressure 135/63, O2 saturation 99% 
on room air.

HEENT:  Conjunctivae and lids are normal.  

NECK:  No mass.

CHEST:  Clear to auscultation.

CARDIOVASCULAR:  S1, S2 normal, no murmur.

ABDOMEN:  Soft, nondistended, nontender.

EXTREMITIES:  No leg edema.

NEUROLOGIC:  The patient is awake, alert, fairly oriented.

 

LABORATORIES:  Done this morning, WBC 11.1, hemoglobin 9.2.  Chemistry done this morning revealed no
rmal basic metabolic panel.

 

 

Dictated By: ANGELINE NIXON/ALEXIA

DD:    01/23/2017 18:00:41

DT:    01/24/2017 07:29:43

Conf#: [id number]

DID#:  [id number]

## 2018-05-14 ENCOUNTER — HOSPITAL ENCOUNTER (OUTPATIENT)
Age: 68
Discharge: HOME | End: 2018-05-14

## 2018-05-14 ENCOUNTER — HOSPITAL ENCOUNTER (OUTPATIENT)
Dept: HOSPITAL 91 - SDS | Age: 68
Discharge: HOME | End: 2018-05-14
Payer: COMMERCIAL

## 2018-05-14 DIAGNOSIS — H40.10X1: ICD-10-CM

## 2018-05-14 DIAGNOSIS — H26.9: Primary | ICD-10-CM

## 2018-05-14 PROCEDURE — 71045 X-RAY EXAM CHEST 1 VIEW: CPT

## 2018-05-14 PROCEDURE — 66984 XCAPSL CTRC RMVL W/O ECP: CPT

## 2018-05-14 RX ADMIN — CIPROFLOXACIN HYDROCHLORIDE 1 DROP: 3 SOLUTION/ DROPS OPHTHALMIC at 13:33

## 2018-05-14 RX ADMIN — DEXAMETHASONE SODIUM PHOSPHATE 1 MG: 4 INJECTION, SOLUTION INTRAMUSCULAR; INTRAVENOUS at 13:38

## 2018-05-14 RX ADMIN — CEFAZOLIN 1 GM: 1 INJECTION, POWDER, FOR SOLUTION INTRAMUSCULAR; INTRAVENOUS at 13:38

## 2018-05-14 RX ADMIN — PHENYLEPHRINE HYDROCHLORIDE 1 DROP: 2.5 SOLUTION/ DROPS OPHTHALMIC at 13:33

## 2018-05-14 RX ADMIN — LIDOCAINE HYDROCHLORIDE 1 ML: 10 INJECTION, SOLUTION EPIDURAL; INFILTRATION; INTRACAUDAL; PERINEURAL at 13:38

## 2018-05-14 RX ADMIN — Medication 1 MG: at 13:38

## 2018-05-14 RX ADMIN — TROPICAMIDE 1 DROP: 10 SOLUTION/ DROPS OPHTHALMIC at 13:34

## 2018-05-14 RX ADMIN — PROPARACAINE HYDROCHLORIDE 1 DROP: 5 SOLUTION/ DROPS OPHTHALMIC at 13:34

## 2018-05-14 RX ADMIN — LIDOCAINE HYDROCHLORIDE 1 APPLIC: 35 GEL OPHTHALMIC at 13:32

## 2018-05-14 RX ADMIN — ACETAZOLAMIDE 1 MG: 500 CAPSULE, EXTENDED RELEASE ORAL at 16:25

## 2018-08-31 ENCOUNTER — HOSPITAL ENCOUNTER (OUTPATIENT)
Age: 68
Discharge: HOME | End: 2018-08-31

## 2018-08-31 ENCOUNTER — HOSPITAL ENCOUNTER (OUTPATIENT)
Dept: HOSPITAL 91 - SDS | Age: 68
Discharge: HOME | End: 2018-08-31
Payer: COMMERCIAL

## 2018-08-31 DIAGNOSIS — H40.9: ICD-10-CM

## 2018-08-31 DIAGNOSIS — I10: ICD-10-CM

## 2018-08-31 DIAGNOSIS — H25.12: Primary | ICD-10-CM

## 2018-08-31 PROCEDURE — 66984 XCAPSL CTRC RMVL W/O ECP: CPT

## 2018-08-31 RX ADMIN — TROPICAMIDE 1 DROP: 10 SOLUTION/ DROPS OPHTHALMIC at 06:44

## 2018-08-31 RX ADMIN — DEXAMETHASONE SODIUM PHOSPHATE 1 MG: 4 INJECTION, SOLUTION INTRAMUSCULAR; INTRAVENOUS at 07:53

## 2018-08-31 RX ADMIN — CIPROFLOXACIN HYDROCHLORIDE 1 DROP: 3 SOLUTION/ DROPS OPHTHALMIC at 06:43

## 2018-08-31 RX ADMIN — CEFAZOLIN 1 GM: 1 INJECTION, POWDER, FOR SOLUTION INTRAMUSCULAR; INTRAVENOUS at 07:52

## 2018-08-31 RX ADMIN — TROPICAMIDE 1 DROP: 10 SOLUTION/ DROPS OPHTHALMIC at 06:49

## 2018-08-31 RX ADMIN — PHENYLEPHRINE HYDROCHLORIDE 1 DROP: 2.5 SOLUTION/ DROPS OPHTHALMIC at 06:39

## 2018-08-31 RX ADMIN — PHENYLEPHRINE HYDROCHLORIDE 1 DROP: 2.5 SOLUTION/ DROPS OPHTHALMIC at 06:50

## 2018-08-31 RX ADMIN — LIDOCAINE HYDROCHLORIDE 1 APPLIC: 35 GEL OPHTHALMIC at 06:49

## 2018-08-31 RX ADMIN — LIDOCAINE HYDROCHLORIDE 1 APPLIC: 35 GEL OPHTHALMIC at 06:44

## 2018-08-31 RX ADMIN — CIPROFLOXACIN HYDROCHLORIDE 1 DROP: 3 SOLUTION/ DROPS OPHTHALMIC at 06:53

## 2018-08-31 RX ADMIN — PROPARACAINE HYDROCHLORIDE 1 DROP: 5 SOLUTION/ DROPS OPHTHALMIC at 06:39

## 2018-08-31 RX ADMIN — PYRIDOXINE HYDROCHLORIDE 1 MLS/HR: 100 INJECTION, SOLUTION INTRAMUSCULAR; INTRAVENOUS at 06:59

## 2018-08-31 RX ADMIN — PHENYLEPHRINE HYDROCHLORIDE 1 DROP: 2.5 SOLUTION/ DROPS OPHTHALMIC at 06:45

## 2018-08-31 RX ADMIN — LIDOCAINE HYDROCHLORIDE 1 APPLIC: 35 GEL OPHTHALMIC at 06:39

## 2018-08-31 RX ADMIN — TROPICAMIDE 1 DROP: 10 SOLUTION/ DROPS OPHTHALMIC at 06:54

## 2018-08-31 RX ADMIN — CIPROFLOXACIN HYDROCHLORIDE 1 DROP: 3 SOLUTION/ DROPS OPHTHALMIC at 06:48

## 2018-08-31 RX ADMIN — ACETAZOLAMIDE 1 MG: 500 CAPSULE, EXTENDED RELEASE ORAL at 10:59

## 2018-08-31 RX ADMIN — LIDOCAINE HYDROCHLORIDE 1 ML: 10 INJECTION, SOLUTION EPIDURAL; INFILTRATION; INTRACAUDAL; PERINEURAL at 07:52
